# Patient Record
Sex: MALE | Race: BLACK OR AFRICAN AMERICAN | URBAN - METROPOLITAN AREA
[De-identification: names, ages, dates, MRNs, and addresses within clinical notes are randomized per-mention and may not be internally consistent; named-entity substitution may affect disease eponyms.]

---

## 2024-09-28 ENCOUNTER — OFFICE VISIT (OUTPATIENT)
Dept: URGENT CARE | Age: 39
End: 2024-09-28
Payer: COMMERCIAL

## 2024-09-28 VITALS
TEMPERATURE: 98.8 F | WEIGHT: 160 LBS | OXYGEN SATURATION: 96 % | SYSTOLIC BLOOD PRESSURE: 126 MMHG | HEART RATE: 85 BPM | DIASTOLIC BLOOD PRESSURE: 89 MMHG | RESPIRATION RATE: 18 BRPM

## 2024-09-28 DIAGNOSIS — J01.90 ACUTE NON-RECURRENT SINUSITIS, UNSPECIFIED LOCATION: Primary | ICD-10-CM

## 2024-09-28 DIAGNOSIS — R05.1 ACUTE COUGH: ICD-10-CM

## 2024-09-28 DIAGNOSIS — H65.192 OTHER NON-RECURRENT ACUTE NONSUPPURATIVE OTITIS MEDIA OF LEFT EAR: ICD-10-CM

## 2024-09-28 PROBLEM — H66.92 LEFT OTITIS MEDIA: Status: ACTIVE | Noted: 2024-09-28

## 2024-09-28 RX ORDER — AMOXICILLIN AND CLAVULANATE POTASSIUM 875; 125 MG/1; MG/1
1 TABLET, FILM COATED ORAL 2 TIMES DAILY
Qty: 20 TABLET | Refills: 0 | Status: SHIPPED | OUTPATIENT
Start: 2024-09-28 | End: 2024-10-08

## 2024-09-28 RX ORDER — FLUTICASONE PROPIONATE 50 MCG
2 SPRAY, SUSPENSION (ML) NASAL DAILY
Qty: 16 G | Refills: 0 | Status: SHIPPED | OUTPATIENT
Start: 2024-09-28 | End: 2024-10-08

## 2024-09-28 RX ORDER — BENZONATATE 200 MG/1
200 CAPSULE ORAL 3 TIMES DAILY PRN
Qty: 30 CAPSULE | Refills: 0 | Status: SHIPPED | OUTPATIENT
Start: 2024-09-28 | End: 2024-10-08

## 2024-09-28 ASSESSMENT — ENCOUNTER SYMPTOMS
FEVER: 0
SINUS PRESSURE: 0
SORE THROAT: 0
SINUS PAIN: 0
FATIGUE: 0
COUGH: 1

## 2024-09-28 NOTE — PROGRESS NOTES
Subjective   Patient ID: Akshat Mortensen is a 39 y.o. male. They present today with a chief complaint of Cough and Sinusitis (Has been coughing for about 2 weeks thinks its a sinus infection ).    History of Present Illness  Patient presents with 2-week history of sinus drainage, cough.  Patient endorses cough, sinus congestion/drainage, body aches, headache.  Patient denies fever, chills, nausea, vomiting, chest pain, shortness of breath, body aches, loss of sense of smell/taste.  They endorse OTC meds for symptoms.         Cough  Associated symptoms include postnasal drip. Pertinent negatives include no fever or sore throat.   Sinusitis  Associated symptoms: cough    Associated symptoms: no fatigue, no fever and no sore throat        Past Medical History  Allergies as of 09/28/2024    (No Known Allergies)       (Not in a hospital admission)       History reviewed. No pertinent past medical history.    History reviewed. No pertinent surgical history.         Review of Systems  Review of Systems   Constitutional:  Negative for fatigue and fever.   HENT:  Positive for postnasal drip. Negative for sinus pressure, sinus pain and sore throat.    Respiratory:  Positive for cough.                                   Objective    Vitals:    09/28/24 0924   BP: 126/89   BP Location: Left arm   Patient Position: Sitting   BP Cuff Size: Adult long   Pulse: 85   Resp: 18   Temp: 37.1 °C (98.8 °F)   TempSrc: Oral   SpO2: 96%   Weight: 72.6 kg (160 lb)     No LMP for male patient.    Physical Exam  Constitutional:       Appearance: Normal appearance.   HENT:      Head: Normocephalic and atraumatic.      Right Ear: Tympanic membrane and ear canal normal.      Left Ear: Ear canal normal.      Ears:      Comments: Left TM erythematous     Mouth/Throat:      Mouth: Mucous membranes are moist.   Eyes:      Pupils: Pupils are equal, round, and reactive to light.   Cardiovascular:      Rate and Rhythm: Normal rate and regular rhythm.    Pulmonary:      Effort: Pulmonary effort is normal.      Breath sounds: Normal breath sounds.   Skin:     General: Skin is warm and dry.   Neurological:      Mental Status: He is alert.         Procedures    Point of Care Test & Imaging Results from this visit  No results found for this visit on 09/28/24.   No results found.    Diagnostic study results (if any) were reviewed by KENNEDI Wan.    Assessment/Plan   Allergies, medications, history, and pertinent labs/EKGs/Imaging reviewed by KENNEDI Wan.   Continue supportive measures, and symptom management. Discussed etiology of viral infection versus bacterial infection. Provided prescription for antibiotic, flonase, tessalon. Advised f/u if s/s increase or worsen.      Medical Decision Making  At time of discharge patient was clinically well-appearing and HDS for outpatient management. The patient and/or family was educated regarding diagnosis, supportive care, OTC and Rx medications. The patient and/or family was given the opportunity to ask questions prior to discharge.  They verbalized understanding of my discussion of the plans for treatment, expected course, indications to return to  or seek further evaluation in ED, and the need for timely follow up as directed.   They were provided with a work/school excuse if requested.      Orders and Diagnoses  Diagnoses and all orders for this visit:  Acute non-recurrent sinusitis, unspecified location  -     fluticasone (Flonase) 50 mcg/actuation nasal spray; Administer 2 sprays into each nostril once daily for 10 days. Shake gently. Before first use, prime pump. After use, clean tip and replace cap.  -     amoxicillin-pot clavulanate (Augmentin) 875-125 mg tablet; Take 1 tablet by mouth 2 times a day for 10 days.  Other non-recurrent acute nonsuppurative otitis media of left ear  -     amoxicillin-pot clavulanate (Augmentin) 875-125 mg tablet; Take 1 tablet by mouth 2 times a day for 10  days.  Acute cough  -     benzonatate (Tessalon) 200 mg capsule; Take 1 capsule (200 mg) by mouth 3 times a day as needed for cough for up to 10 days. Do not crush or chew.      Medical Admin Record      Patient disposition: Home    Electronically signed by KENNEDI Wan  9:58 AM

## 2024-10-16 ENCOUNTER — OFFICE VISIT (OUTPATIENT)
Dept: URGENT CARE | Age: 39
End: 2024-10-16
Payer: COMMERCIAL

## 2024-10-16 ENCOUNTER — HOSPITAL ENCOUNTER (OUTPATIENT)
Dept: RADIOLOGY | Facility: CLINIC | Age: 39
Discharge: HOME | End: 2024-10-16
Payer: COMMERCIAL

## 2024-10-16 VITALS
OXYGEN SATURATION: 94 % | SYSTOLIC BLOOD PRESSURE: 125 MMHG | TEMPERATURE: 98.8 F | HEART RATE: 99 BPM | RESPIRATION RATE: 17 BRPM | DIASTOLIC BLOOD PRESSURE: 87 MMHG

## 2024-10-16 DIAGNOSIS — R05.9 COUGH, UNSPECIFIED TYPE: ICD-10-CM

## 2024-10-16 DIAGNOSIS — J18.9 PNEUMONIA OF RIGHT UPPER LOBE DUE TO INFECTIOUS ORGANISM: Primary | ICD-10-CM

## 2024-10-16 LAB — POC SARS-COV-2 AG BINAX: NORMAL

## 2024-10-16 PROCEDURE — 71046 X-RAY EXAM CHEST 2 VIEWS: CPT

## 2024-10-16 PROCEDURE — 71046 X-RAY EXAM CHEST 2 VIEWS: CPT | Performed by: RADIOLOGY

## 2024-10-16 RX ORDER — BENZONATATE 200 MG/1
200 CAPSULE ORAL 3 TIMES DAILY PRN
Qty: 30 CAPSULE | Refills: 0 | Status: SHIPPED | OUTPATIENT
Start: 2024-10-16 | End: 2024-10-26

## 2024-10-16 RX ORDER — LEVOFLOXACIN 500 MG/1
500 TABLET, FILM COATED ORAL NIGHTLY
Qty: 7 TABLET | Refills: 0 | Status: SHIPPED | OUTPATIENT
Start: 2024-10-16 | End: 2024-10-23

## 2024-10-16 NOTE — LETTER
October 16, 2024     Patient: Akshat Mortensen   YOB: 1985   Date of Visit: 10/16/2024       To Whom It May Concern:    Akshat Mortensen was seen in my clinic on 10/16/2024 at 5:30 pm. Please excuse Akshat for his absence from work on 10/16/2024-10/18/2024. He may return on 10/19/2024  If you have any questions or concerns, please don't hesitate to call.         Sincerely,         Laila Pritchett MD        CC: No Recipients

## 2024-10-16 NOTE — PROGRESS NOTES
HPI:  Patient states that after last visit and taking Augmentin he was feeling better for a few days, but symptoms started to get worse again 4 days ago.  +chills. +increase in cough.  No CP or SOB.      ROS:  +cough  +chills  No SOB  No CP    PE:    A&O x3  NCAT  PERRLA, EOMI  TM clear bl  No pharyngeal erythema  Sinus tachy  Decreased breath sounds in right upper lobe  MOEx4  No focal deficit  Judgement normal    Results:  CXR:There is a patchy region of airspace consolidation noted in the  right upper lobe concerning for pneumonia.    A/P:   Cough  Pneumonia    Increase fluids.  Rest.  Eat yogurt and take probiotics when on medication.  Tylenol and Motrin as needed for pain.  Keep a diary of symptoms.  Recheck with your doctor in 4 days if no improvement. Go to the ER if starts getting worse.

## 2025-02-27 ENCOUNTER — OFFICE VISIT (OUTPATIENT)
Dept: URGENT CARE | Age: 40
End: 2025-02-27
Payer: COMMERCIAL

## 2025-02-27 VITALS
SYSTOLIC BLOOD PRESSURE: 138 MMHG | TEMPERATURE: 98.2 F | RESPIRATION RATE: 18 BRPM | HEART RATE: 90 BPM | OXYGEN SATURATION: 95 % | DIASTOLIC BLOOD PRESSURE: 87 MMHG | WEIGHT: 160 LBS

## 2025-02-27 DIAGNOSIS — B34.9 VIRAL SYNDROME: Primary | ICD-10-CM

## 2025-02-27 ASSESSMENT — ENCOUNTER SYMPTOMS
SINUS COMPLAINT: 1
SINUS PRESSURE: 1

## 2025-02-27 NOTE — PROGRESS NOTES
Subjective   Patient ID: Akshat Mortensen is a 39 y.o. male. They present today with a chief complaint of Sinus Problem (Off and on for Months- Sinus Pressure ), Nasal Congestion (Post nasal drip ), and Earache.    History of Present Illness    Sinus Problem  Associated symptoms: congestion and ear pain    Earache       This is a 39-year-old male who presents today complaining sinus congestion intermittent in nature for the past few months.  He also complains some ear ache and postnasal drainage.  Denies any fever or chills.  Denies any cough.  Denies any shortness of breath.  past Medical History  Allergies as of 02/27/2025 - Reviewed 02/27/2025   Allergen Reaction Noted    Bee pollen Other 11/20/2018       (Not in a hospital admission)       History reviewed. No pertinent past medical history.    History reviewed. No pertinent surgical history.     reports that he has never smoked. He has never used smokeless tobacco. He reports that he does not drink alcohol and does not use drugs.    Review of Systems  Review of Systems   HENT:  Positive for congestion, ear pain, postnasal drip and sinus pressure.    All other systems reviewed and are negative.                                 Objective    Vitals:    02/27/25 0842   BP: 138/87   Pulse: 90   Resp: 18   Temp: 36.8 °C (98.2 °F)   SpO2: 95%   Weight: 72.6 kg (160 lb)     No LMP for male patient.    Physical Exam  The patient is awake alert oriented x 3 in no acute distress.  Vital signs are stable.  He is afebrile.   Throat nonerythematous.  TMs are intact.  EACs were patent.  There was no frontal or maxillary sinus tenderness to percussion.  Nares slightly congested.   Cardiovascular is regular rate and rhythm.   Lungs are clear throughout.  Procedures    Point of Care Test & Imaging Results from this visit  No results found for this visit on 02/27/25.   No results found.    Diagnostic study results (if any) were reviewed by Milton Juarez DO.    Assessment/Plan    Allergies, medications, history, and pertinent labs/EKGs/Imaging reviewed by Milton Juarez DO.     Medical Decision Making  The patient was reassured he was then discharged home in satisfactory condition with instruction to rest increase fluid use Mucinex D over-the-counter twice daily for the next 5 days.    Orders and Diagnoses  Diagnoses and all orders for this visit:  Viral syndrome      Medical Admin Record      Patient disposition: Home    Electronically signed by Milton Juarez DO  8:55 AM